# Patient Record
Sex: FEMALE | Race: WHITE | NOT HISPANIC OR LATINO | Employment: OTHER | URBAN - METROPOLITAN AREA
[De-identification: names, ages, dates, MRNs, and addresses within clinical notes are randomized per-mention and may not be internally consistent; named-entity substitution may affect disease eponyms.]

---

## 2020-01-23 ENCOUNTER — NEW PATIENT COMPREHENSIVE (OUTPATIENT)
Dept: URBAN - METROPOLITAN AREA CLINIC 36 | Facility: CLINIC | Age: 65
End: 2020-01-23

## 2020-01-23 DIAGNOSIS — H25.811: ICD-10-CM

## 2020-01-23 DIAGNOSIS — H52.7: ICD-10-CM

## 2020-01-23 DIAGNOSIS — H25.812: ICD-10-CM

## 2020-01-23 PROCEDURE — 92015 DETERMINE REFRACTIVE STATE: CPT

## 2020-01-23 PROCEDURE — 92004 COMPRE OPH EXAM NEW PT 1/>: CPT

## 2020-01-23 ASSESSMENT — TONOMETRY
OS_IOP_MMHG: 16
OD_IOP_MMHG: 16

## 2020-01-23 ASSESSMENT — VISUAL ACUITY
OS_SC: J8
OS_CC: J2
OD_CC: J1
OD_SC: 20/70-1
OD_SC: J10-1
OS_SC: 20/60+2
OD_CC: 20/30-1
OS_CC: 20/30

## 2020-03-05 ENCOUNTER — CONSULT (OUTPATIENT)
Dept: URBAN - METROPOLITAN AREA CLINIC 44 | Facility: CLINIC | Age: 65
End: 2020-03-05

## 2020-03-05 DIAGNOSIS — H02.831: ICD-10-CM

## 2020-03-05 DIAGNOSIS — H25.812: ICD-10-CM

## 2020-03-05 DIAGNOSIS — H02.834: ICD-10-CM

## 2020-03-05 PROCEDURE — 99213 OFFICE O/P EST LOW 20 MIN: CPT

## 2020-03-05 PROCEDURE — 92285 EXTERNAL OCULAR PHOTOGRAPHY: CPT

## 2020-03-05 ASSESSMENT — VISUAL ACUITY
OD_CC: 20/20
OS_CC: 20/20

## 2020-03-09 ENCOUNTER — TECH ONLY (OUTPATIENT)
Dept: URBAN - METROPOLITAN AREA CLINIC 36 | Facility: CLINIC | Age: 65
End: 2020-03-09

## 2020-03-09 DIAGNOSIS — H02.831: ICD-10-CM

## 2020-03-09 DIAGNOSIS — H02.834: ICD-10-CM

## 2020-03-09 PROCEDURE — 99211T TECH SERVICE

## 2020-03-09 PROCEDURE — 92082 INTERMEDIATE VISUAL FIELD XM: CPT

## 2020-07-16 ENCOUNTER — PRE-OP/H&P (OUTPATIENT)
Dept: URBAN - METROPOLITAN AREA CLINIC 44 | Facility: CLINIC | Age: 65
End: 2020-07-16

## 2020-07-16 DIAGNOSIS — H04.123: ICD-10-CM

## 2020-07-16 DIAGNOSIS — H02.834: ICD-10-CM

## 2020-07-16 DIAGNOSIS — H25.811: ICD-10-CM

## 2020-07-16 DIAGNOSIS — H25.812: ICD-10-CM

## 2020-07-16 DIAGNOSIS — H02.831: ICD-10-CM

## 2020-07-16 PROCEDURE — 99211HP H&P OFFICE/OUTPATIENT VISIT, EST

## 2020-07-16 RX ORDER — TRAMADOL HCL 50 MG/1
1 TABLET ORAL
Start: 2020-07-27

## 2020-07-16 RX ORDER — ERYTHROMYCIN 5 MG/G
OINTMENT OPHTHALMIC
Start: 2020-07-27

## 2020-07-28 ENCOUNTER — PRE-OP/H&P (OUTPATIENT)
Dept: URBAN - METROPOLITAN AREA SURGERY 14 | Facility: SURGERY | Age: 65
End: 2020-07-28

## 2020-07-28 ENCOUNTER — SURGERY/PROCEDURE (OUTPATIENT)
Dept: URBAN - METROPOLITAN AREA SURGERY 14 | Facility: SURGERY | Age: 65
End: 2020-07-28

## 2020-07-28 DIAGNOSIS — H25.812: ICD-10-CM

## 2020-07-28 DIAGNOSIS — H02.831: ICD-10-CM

## 2020-07-28 DIAGNOSIS — Z41.1: ICD-10-CM

## 2020-07-28 DIAGNOSIS — H02.834: ICD-10-CM

## 2020-07-28 PROCEDURE — 15823 BLEPHARP UPR EYELID XCSV SKN: CPT

## 2020-07-28 PROCEDURE — 99499 UNLISTED E&M SERVICE: CPT

## 2020-07-28 PROCEDURE — 15823F PER EYE FAT REMOVAL BILL W/ 15823

## 2020-08-05 ENCOUNTER — POST-OP (OUTPATIENT)
Dept: URBAN - METROPOLITAN AREA CLINIC 39 | Facility: CLINIC | Age: 65
End: 2020-08-05

## 2020-08-05 DIAGNOSIS — H25.812: ICD-10-CM

## 2020-08-05 DIAGNOSIS — Z98.890: ICD-10-CM

## 2020-08-05 DIAGNOSIS — H04.123: ICD-10-CM

## 2020-08-05 DIAGNOSIS — H25.811: ICD-10-CM

## 2020-08-05 DIAGNOSIS — H02.831: ICD-10-CM

## 2020-08-05 DIAGNOSIS — H02.834: ICD-10-CM

## 2020-08-05 PROCEDURE — 99024 POSTOP FOLLOW-UP VISIT: CPT

## 2020-08-05 ASSESSMENT — VISUAL ACUITY
OS_SC: 20/40-1
OD_SC: 20/40-1

## 2021-03-08 ENCOUNTER — ESTABLISHED COMPREHENSIVE EXAM (OUTPATIENT)
Dept: URBAN - METROPOLITAN AREA CLINIC 36 | Facility: CLINIC | Age: 66
End: 2021-03-08

## 2021-03-08 DIAGNOSIS — H25.811: ICD-10-CM

## 2021-03-08 DIAGNOSIS — H25.812: ICD-10-CM

## 2021-03-08 DIAGNOSIS — H52.7: ICD-10-CM

## 2021-03-08 PROCEDURE — 92014 COMPRE OPH EXAM EST PT 1/>: CPT

## 2021-03-08 PROCEDURE — 92015 DETERMINE REFRACTIVE STATE: CPT

## 2021-03-08 ASSESSMENT — VISUAL ACUITY
OS_CC: J1+
OS_SC: J5
OD_SC: 20/100
OS_SC: 20/60
OS_CC: 20/25
OD_CC: J1+
OD_SC: J10
OD_CC: 20/25+2

## 2021-03-08 ASSESSMENT — TONOMETRY
OS_IOP_MMHG: 14
OD_IOP_MMHG: 14

## 2021-04-15 NOTE — PATIENT DISCUSSION
4/15/21: AREAS OF PERIPHERAL NON PERFUSION. TRACE VH BUT NO EVIDENCE OF NV. LAST EYLEA TX  02/12/21,F/U 8 WEEKS FOR TX IN [place].

## 2021-04-15 NOTE — PATIENT DISCUSSION
PT W WIFE AS INDENPENDENT HISTORIAN HERE TO CONFIRM DATA. IMAGES, DIAGNOSIS & PROGNOSIS & PLAN OF CARE DISCUSSED WITH BOTH OF THEM. BOTH VOICED UNDERSTANDING.

## 2021-04-15 NOTE — PROCEDURE NOTE: CLINICAL
PROCEDURE NOTE: Eylea Sample OS. Diagnosis: Central Retinal Vein Occlusion with Macular Edema. Anesthesia: Topical/Subconjunctival. Prep: Betadine Drops. Prior to injection, risks/benefits/alternatives discussed including infection, loss of vision, hemorrhage, cataract, glaucoma, retinal tears or detachment. The patient wished to proceed with treatment. Betadine prep was performed. Topical anesthesia was induced with Alcaine. Additional anesthesia was achieved using drop(s) or injection checked above. A drop of Povidone-iodine 5% ophthalmic solution was instilled over the injection site and in the inferior fornix. Using the syringe provided, Eylea 2.0mg in 0.05 cc was injected into the vitreous cavity. The remainder of the Eylea in the single-use vial was then discarded in a medical waste disposal container. The needle was passed 3.0 mm posterior to the limbus in pseudophakic patients, and 3.5 mm posterior to the limbus in phakic patients. The eye was irrigated with sterile irrigating solution. Patient tolerated procedure well. There were no complications. Post procedure instructions given. Injection time: *. The patient was instructed to return for re-evaluation in approximately 4-12 weeks depending on his/her condition and was told to call immediately if vision decreases and/or if his/her eye becomes red, painful, and/or light sensitive. The patient was instructed to go to the emergency room or call 911 if unable to reach the doctor within an hour or two of trying or calling. The patient was instructed to use Artificial Tears q.i.d. p.r.n for comfort. Go Eastman

## 2021-11-22 NOTE — PATIENT DISCUSSION
11/22/21: GREAT RESPONSE TO BENSON SEQUENCE OF TX. PT HAD RECEIVED AVASTIN IN THE PAST W/O SUCCESS. Landy KNOWLES FOR CONTINUITY OF TX. WILL USE SAMPLE TODAY UNTIL INSURANCE APPROVAL. WE'LL ATTEMPT EXTENDING TX INTERVAL NEXT APPT.

## 2021-11-22 NOTE — PROCEDURE NOTE: CLINICAL
PROCEDURE NOTE: Eylea Sample #2 OS. Diagnosis: Central Retinal Vein Occlusion with Macular Edema. Anesthesia: Akten Gel 3.5%. Prep: Betadine Drops. Prior to injection, risks/benefits/alternatives discussed including infection, loss of vision, hemorrhage, cataract, glaucoma, retinal tears or detachment. The patient wished to proceed with treatment. Betadine prep was performed. Topical anesthesia was induced with Alcaine. Additional anesthesia was achieved using drop(s) or injection checked above. A drop of Povidone-iodine 5% ophthalmic solution was instilled over the injection site and in the inferior fornix. Using the syringe provided, Eylea 2.0mg in 0.05 cc was injected into the vitreous cavity. The remainder of the Eylea in the single-use vial was then discarded in a medical waste disposal container. The needle was passed 3.0 mm posterior to the limbus in pseudophakic patients, and 3.5 mm posterior to the limbus in phakic patients. The eye was irrigated with sterile irrigating solution. Patient tolerated procedure well. There were no complications. Post procedure instructions given. Injection time: 3:00 PM. The patient was instructed to return for re-evaluation in approximately 4-12 weeks depending on his/her condition and was told to call immediately if vision decreases and/or if his/her eye becomes red, painful, and/or light sensitive. The patient was instructed to go to the emergency room or call 911 if unable to reach the doctor within an hour or two of trying or calling. The patient was instructed to use Artificial Tears q.i.d. p.r.n for comfort. Lion Matta

## 2022-01-25 NOTE — PATIENT DISCUSSION
11/22/21: GREAT RESPONSE TO EYLEA SEQUENCE OF TX. PT HAD RECEIVED AVASTIN IN THE PAST W/O SUCCESS. Horacio KNOWLES FOR CONTINUITY OF TX. WILL USE SAMPLE TODAY UNTIL INSURANCE APPROVAL. WE'LL ATTEMPT EXTENDING TX INTERVAL NEXT APPT.

## 2022-01-25 NOTE — PROCEDURE NOTE: CLINICAL
PROCEDURE NOTE: Eylea PFS OS. Diagnosis: Central Retinal Vein Occlusion with Macular Edema. Anesthesia: Topical/Subconjunctival. Prep: Betadine Drops. Prior to injection, risks/benefits/alternatives discussed including but not limited to infection, loss of vision or eye, hemorrhage, cataract, glaucoma, retinal tears or detachment. The patient wished to proceed with treatment. Betadine prep was performed. Topical anesthesia was induced with Alcaine. Additional anesthesia was achieved using drop(s) or injection checked above. A drop of Povidone-iodine 5% ophthalmic solution was instilled over the injection site and in the inferior fornix. A single use prefilled syringe of intravitreal Eylea 2mg/0.05ml was used and excess was disposed of as waste. The needle was passed 3.0 mm posterior to the limbus in pseudophakic patients, and 3.5 mm posterior to the limbus in phakic patients. Injection time: 1007 AM.  Patient tolerated procedure well. There were no complications. The eye was irrigated with sterile irrigating solution. Post procedure instructions given. The patient was instructed to use Artificial Tears q.i.d. p.r.n for comfort. The patient was instructed to return for re-evaluation in approximately 4-12 weeks depending on his/her condition and was told to call immediately if vision decreases and/or if his/her eye becomes red, painful, and/or light sensitive. The patient was instructed to go to the emergency room or call 911 if unable to reach the doctor within an hour or two of trying or calling. Juan A Whitley

## 2022-03-09 ENCOUNTER — COMPREHENSIVE EXAM (OUTPATIENT)
Dept: URBAN - METROPOLITAN AREA CLINIC 36 | Facility: CLINIC | Age: 67
End: 2022-03-09

## 2022-03-09 DIAGNOSIS — H52.7: ICD-10-CM

## 2022-03-09 DIAGNOSIS — H25.812: ICD-10-CM

## 2022-03-09 DIAGNOSIS — H25.811: ICD-10-CM

## 2022-03-09 DIAGNOSIS — H04.123: ICD-10-CM

## 2022-03-09 PROCEDURE — 92015 DETERMINE REFRACTIVE STATE: CPT

## 2022-03-09 PROCEDURE — 92014 COMPRE OPH EXAM EST PT 1/>: CPT

## 2022-03-09 ASSESSMENT — VISUAL ACUITY
OD_SC: 20/100
OS_SC: 20/50
OD_CC: 20/20-2
OS_CC: J1
OS_SC: >J10
OD_CC: J1+
OD_SC: >J10
OS_CC: 20/25

## 2022-03-09 ASSESSMENT — TONOMETRY
OS_IOP_MMHG: 13
OD_IOP_MMHG: 14

## 2022-04-05 NOTE — PATIENT DISCUSSION
11/22/21: GREAT RESPONSE TO BENSON SEQUENCE OF TX. PT HAD RECEIVED AVASTIN IN THE PAST W/O SUCCESS. Regina Phoenix BENSON FOR CONTINUITY OF TX. WILL USE SAMPLE TODAY UNTIL INSURANCE APPROVAL. WE'LL ATTEMPT EXTENDING TX INTERVAL NEXT APPT.

## 2022-04-05 NOTE — PROCEDURE NOTE: CLINICAL
PROCEDURE NOTE: Eylea PFS OS. Diagnosis: Central Retinal Vein Occlusion with Macular Edema. Anesthesia: Topical/Subconjunctival. Prep: Betadine Drops. Prior to injection, risks/benefits/alternatives discussed including but not limited to infection, loss of vision or eye, hemorrhage, cataract, glaucoma, retinal tears or detachment. The patient wished to proceed with treatment. Betadine prep was performed. Topical anesthesia was induced with Alcaine. Additional anesthesia was achieved using drop(s) or injection checked above. A drop of Povidone-iodine 5% ophthalmic solution was instilled over the injection site and in the inferior fornix. A single use prefilled syringe of intravitreal Eylea 2mg/0.05ml was used and excess was disposed of as waste. The needle was passed 3.0 mm posterior to the limbus in pseudophakic patients, and 3.5 mm posterior to the limbus in phakic patients. Injection time: 1:30pm.  Patient tolerated procedure well. There were no complications. The eye was irrigated with sterile irrigating solution. Post procedure instructions given. The patient was instructed to use Artificial Tears q.i.d. p.r.n for comfort. The patient was instructed to return for re-evaluation in approximately 4-12 weeks depending on his/her condition and was told to call immediately if vision decreases and/or if his/her eye becomes red, painful, and/or light sensitive. The patient was instructed to go to the emergency room or call 911 if unable to reach the doctor within an hour or two of trying or calling. Lion Matta

## 2022-10-31 NOTE — PATIENT DISCUSSION
10/31/22: SECONDARY TO CRVO OS. FORM FILLED FOR DMV. PT PASSES WITH 140 DEGREES OF VISION HORIZONTALLY. BILATERAL VA; 20/40.

## 2022-10-31 NOTE — PATIENT DISCUSSION
11/22/21: GREAT RESPONSE TO BENSON SEQUENCE OF TX. PT HAD RECEIVED AVASTIN IN THE PAST W/O SUCCESS. Suad KNOWLES FOR CONTINUITY OF TX. WILL USE SAMPLE TODAY UNTIL INSURANCE APPROVAL. WE'LL ATTEMPT EXTENDING TX INTERVAL NEXT APPT.

## 2022-12-02 NOTE — PROCEDURE NOTE: CLINICAL
PROCEDURE NOTE: Eylea PFS OS. Diagnosis: Central Retinal Vein Occlusion with Macular Edema. Anesthesia: Subconjunctival. Prep: Betadine Drops. Prior to injection, risks/benefits/alternatives discussed including but not limited to infection, loss of vision or eye, hemorrhage, cataract, glaucoma, retinal tears or detachment. The patient wished to proceed with treatment. Betadine prep was performed. Topical anesthesia was induced with Alcaine. Additional anesthesia was achieved using drop(s) or injection checked above. A drop of Povidone-iodine 5% ophthalmic solution was instilled over the injection site and in the inferior fornix. A single use prefilled syringe of intravitreal Eylea 2mg/0.05ml was used and excess was disposed of as waste. The needle was passed 3.0 mm posterior to the limbus in pseudophakic patients, and 3.5 mm posterior to the limbus in phakic patients. Injection time: 10:55AM.  Patient tolerated procedure well. There were no complications. The eye was irrigated with sterile irrigating solution. Post procedure instructions given. The patient was instructed to use Artificial Tears q.i.d. p.r.n for comfort. The patient was instructed to return for re-evaluation in approximately 4-12 weeks depending on his/her condition and was told to call immediately if vision decreases and/or if his/her eye becomes red, painful, and/or light sensitive. The patient was instructed to go to the emergency room or call 911 if unable to reach the doctor within an hour or two of trying or calling. Macarena Mercer

## 2022-12-02 NOTE — PATIENT DISCUSSION
11/22/21: GREAT RESPONSE TO BENSON SEQUENCE OF TX. PT HAD RECEIVED AVASTIN IN THE PAST W/O SUCCESS. Danae Head BENSON FOR CONTINUITY OF TX. WILL USE SAMPLE TODAY UNTIL INSURANCE APPROVAL. WE'LL ATTEMPT EXTENDING TX INTERVAL NEXT APPT.

## 2023-01-13 NOTE — PATIENT DISCUSSION
11/22/21: GREAT RESPONSE TO EYLEA SEQUENCE OF TX. PT HAD RECEIVED AVASTIN IN THE PAST W/O SUCCESS. Micah KNOWLES FOR CONTINUITY OF TX. WILL USE SAMPLE TODAY UNTIL INSURANCE APPROVAL. WE'LL ATTEMPT EXTENDING TX INTERVAL NEXT APPT.

## 2023-01-13 NOTE — PATIENT DISCUSSION
1/13/23: Cataract APPEARS VISUALLY SIGNIFICANT and patient advised to SEE A CATARACT SURGEON -DR GARCIA/GÉNESIS- for evaluation and treatment.

## 2023-01-13 NOTE — PROCEDURE NOTE: CLINICAL
PROCEDURE NOTE: Eylea PFS OS. Diagnosis: Central Retinal Vein Occlusion with Macular Edema. Anesthesia: Subconjunctival. Prep: Betadine Drops. Prior to injection, risks/benefits/alternatives discussed including but not limited to infection, loss of vision or eye, hemorrhage, cataract, glaucoma, retinal tears or detachment. The patient wished to proceed with treatment. Betadine prep was performed. Topical anesthesia was induced with Alcaine. Additional anesthesia was achieved using drop(s) or injection checked above. A drop of Povidone-iodine 5% ophthalmic solution was instilled over the injection site and in the inferior fornix. A single use prefilled syringe of intravitreal Eylea 2mg/0.05ml was used and excess was disposed of as waste. The needle was passed 3.0 mm posterior to the limbus in pseudophakic patients, and 3.5 mm posterior to the limbus in phakic patients. Injection time: 1134AM.  Patient tolerated procedure well. There were no complications. The eye was irrigated with sterile irrigating solution. Post procedure instructions given. The patient was instructed to use Artificial Tears q.i.d. p.r.n for comfort. The patient was instructed to return for re-evaluation in approximately 4-12 weeks depending on his/her condition and was told to call immediately if vision decreases and/or if his/her eye becomes red, painful, and/or light sensitive. The patient was instructed to go to the emergency room or call 911 if unable to reach the doctor within an hour or two of trying or calling. Sandy Zimmerman

## 2023-01-27 NOTE — PATIENT DISCUSSION
Advised that the presence of an epiretinal membrane and CRVO may occasionally worsen or lead to macular edema following cataract surgery.

## 2024-04-05 ENCOUNTER — COMPREHENSIVE EXAM (OUTPATIENT)
Dept: URBAN - METROPOLITAN AREA CLINIC 36 | Facility: CLINIC | Age: 69
End: 2024-04-05

## 2024-04-05 DIAGNOSIS — H52.7: ICD-10-CM

## 2024-04-05 DIAGNOSIS — H25.813: ICD-10-CM

## 2024-04-05 DIAGNOSIS — H04.123: ICD-10-CM

## 2024-04-05 PROCEDURE — 92014 COMPRE OPH EXAM EST PT 1/>: CPT

## 2024-04-05 PROCEDURE — 92015 DETERMINE REFRACTIVE STATE: CPT

## 2024-04-05 ASSESSMENT — TONOMETRY
OS_IOP_MMHG: 13
OD_IOP_MMHG: 14

## 2024-04-05 ASSESSMENT — VISUAL ACUITY
OD_CC: J3
OD_CC: 20/25
OU_CC: J3
OS_PH: 20/25
OS_SC: J12
OD_SC: J12
OS_CC: J3
OD_SC: 20/70
OU_SC: J12
OS_SC: 20/50
OS_CC: 20/40

## 2025-03-13 ENCOUNTER — COMPREHENSIVE EXAM (OUTPATIENT)
Age: 70
End: 2025-03-13

## 2025-03-13 DIAGNOSIS — Z98.890: ICD-10-CM

## 2025-03-13 DIAGNOSIS — H25.813: ICD-10-CM

## 2025-03-13 DIAGNOSIS — H04.123: ICD-10-CM

## 2025-03-13 DIAGNOSIS — H52.7: ICD-10-CM

## 2025-03-13 PROCEDURE — 92014 COMPRE OPH EXAM EST PT 1/>: CPT

## 2025-03-13 PROCEDURE — 92015 DETERMINE REFRACTIVE STATE: CPT

## 2025-05-06 ENCOUNTER — CONSULTATION/EVALUATION (OUTPATIENT)
Age: 70
End: 2025-05-06

## 2025-05-06 DIAGNOSIS — H18.593: ICD-10-CM

## 2025-05-06 DIAGNOSIS — H18.513: ICD-10-CM

## 2025-05-06 DIAGNOSIS — H25.813: ICD-10-CM

## 2025-05-06 DIAGNOSIS — H04.123: ICD-10-CM

## 2025-05-06 PROCEDURE — 92025 CPTRIZED CORNEAL TOPOGRAPHY: CPT

## 2025-05-06 PROCEDURE — 92136 OPHTHALMIC BIOMETRY: CPT

## 2025-05-06 PROCEDURE — 99214 OFFICE O/P EST MOD 30 MIN: CPT | Mod: 25

## 2025-05-06 PROCEDURE — 92134 CPTRZ OPH DX IMG PST SGM RTA: CPT | Mod: NC

## 2025-05-06 PROCEDURE — A4262 TEMPORARY TEAR DUCT PLUG: HCPCS

## 2025-05-06 PROCEDURE — 68761L LACRIFILL: Mod: 50,E2,E4

## 2025-05-06 RX ORDER — CYCLOSPORINE OPHTHALMIC SOLUTION 1 MG/ML: 1 SOLUTION/ DROPS OPHTHALMIC TWICE A DAY

## 2025-05-06 RX ORDER — DOXYCYCLINE 50 MG/1: 1 CAPSULE ORAL TWICE A DAY

## 2025-06-12 ENCOUNTER — PRE-OP/H&P (OUTPATIENT)
Age: 70
End: 2025-06-12

## 2025-06-12 ENCOUNTER — SURGERY/PROCEDURE (OUTPATIENT)
Age: 70
End: 2025-06-12

## 2025-06-12 DIAGNOSIS — H25.813: ICD-10-CM

## 2025-06-12 DIAGNOSIS — H18.513: ICD-10-CM

## 2025-06-12 DIAGNOSIS — H04.123: ICD-10-CM

## 2025-06-12 DIAGNOSIS — H18.593: ICD-10-CM

## 2025-06-12 PROCEDURE — 99199PCV PROF CUSTOM VISION PACKAGE

## 2025-06-12 PROCEDURE — 66984CV REMOVE CATARACT, INSERT LENS, CUSTOM VISION

## 2025-06-12 PROCEDURE — 66999LNSR LENSAR LASER FOR CAT SX

## 2025-06-12 PROCEDURE — 99211HP H&P OFFICE/OUTPATIENT VISIT, EST

## 2025-06-13 ENCOUNTER — POST-OP (OUTPATIENT)
Age: 70
End: 2025-06-13

## 2025-06-13 DIAGNOSIS — Z96.1: ICD-10-CM

## 2025-06-13 PROCEDURE — G9903 PT SCRN TBCO ID AS NON USER: HCPCS

## 2025-06-13 PROCEDURE — 99024 POSTOP FOLLOW-UP VISIT: CPT

## 2025-06-13 PROCEDURE — 1036F TOBACCO NON-USER: CPT

## 2025-06-19 ENCOUNTER — SURGERY/PROCEDURE (OUTPATIENT)
Age: 70
End: 2025-06-19

## 2025-06-19 ENCOUNTER — PRE-OP/H&P (OUTPATIENT)
Age: 70
End: 2025-06-19

## 2025-06-19 DIAGNOSIS — H25.812: ICD-10-CM

## 2025-06-19 PROCEDURE — 66999LNSR LENSAR LASER FOR CAT SX

## 2025-06-19 PROCEDURE — 65772LRI LRI DURING CAT SX

## 2025-06-19 PROCEDURE — 99211HP PRE-OP

## 2025-06-19 PROCEDURE — 99199PCV PROF CUSTOM VISION PACKAGE

## 2025-06-19 PROCEDURE — 66982CV COMPLEX CATARACT WITH IOL, CUSTOM VISION: Mod: 79,LT

## 2025-06-20 ENCOUNTER — POST-OP (OUTPATIENT)
Age: 70
End: 2025-06-20

## 2025-06-20 DIAGNOSIS — Z96.1: ICD-10-CM

## 2025-06-27 ENCOUNTER — POST-OP (OUTPATIENT)
Age: 70
End: 2025-06-27

## 2025-06-27 DIAGNOSIS — Z96.1: ICD-10-CM

## 2025-06-27 PROCEDURE — 99024 POSTOP FOLLOW-UP VISIT: CPT

## 2025-07-22 ENCOUNTER — POST-OP (OUTPATIENT)
Age: 70
End: 2025-07-22

## 2025-07-22 DIAGNOSIS — Z96.1: ICD-10-CM

## 2025-07-22 PROCEDURE — 99024 POSTOP FOLLOW-UP VISIT: CPT
